# Patient Record
Sex: MALE | Race: WHITE | NOT HISPANIC OR LATINO | Employment: FULL TIME | URBAN - METROPOLITAN AREA
[De-identification: names, ages, dates, MRNs, and addresses within clinical notes are randomized per-mention and may not be internally consistent; named-entity substitution may affect disease eponyms.]

---

## 2023-10-13 PROBLEM — T14.8XXA AVULSION FRACTURE: Status: ACTIVE | Noted: 2023-10-13

## 2023-10-13 PROBLEM — N46.9 MALE INFERTILITY: Status: ACTIVE | Noted: 2023-10-13

## 2023-10-16 ENCOUNTER — OFFICE VISIT (OUTPATIENT)
Dept: ORTHOPEDIC SURGERY | Facility: CLINIC | Age: 33
End: 2023-10-16
Payer: COMMERCIAL

## 2023-10-16 ENCOUNTER — CLINICAL SUPPORT (OUTPATIENT)
Dept: ORTHOPEDIC SURGERY | Facility: CLINIC | Age: 33
End: 2023-10-16
Payer: COMMERCIAL

## 2023-10-16 DIAGNOSIS — S63.289D DISLOCATION OF FINGER PIP JOINT, SUBSEQUENT ENCOUNTER: ICD-10-CM

## 2023-10-16 DIAGNOSIS — S63.289D DISLOCATION OF FINGER PIP JOINT, SUBSEQUENT ENCOUNTER: Primary | ICD-10-CM

## 2023-10-16 PROCEDURE — 73140 X-RAY EXAM OF FINGER(S): CPT | Mod: LEFT SIDE | Performed by: FAMILY MEDICINE

## 2023-10-16 PROCEDURE — 99203 OFFICE O/P NEW LOW 30 MIN: CPT | Performed by: FAMILY MEDICINE

## 2023-10-16 NOTE — PROGRESS NOTES
Established Patient Follow-Up Visit    CC: No chief complaint on file.      HPI:  Merritt is a 33 y.o. male returns here today for follow-up visit regarding: Stiffness and swelling to the left fifth PIP joint.  He presents here today for follow-up, he has been active still working out and taking his finger for activities.  He is wean from the splint and is just taping intermittently for discomfort and support when doing activities.  He presents here today for further evaluation.  States still significant amount of swelling intermittent pain rates at about a 1 or 2.          REVIEW OF SYSTEMS:  GENERAL: Negative for malaise, significant weight loss, fever  MUSCULOSKELETAL: See HPI  NEURO: Negative for numbness / tingling       PHYSICAL EXAM:  -Neuro: Gross sensation intact to the upper extremities bilaterally.  -Extremity: Left small finger exam demonstrates soft tissue swelling about the PIP joint.  He has stiffness noted and lacks terminal extension by probably 5 degrees.  He is able to flex by 90 degrees.  Full intact extensor mechanism noted.  Negative Winston test.  Good distal cap refill noted.  No obvious medial or lateral angulation or rotational deformity seen.  No swan neck deformity.  No redness or erythema.  Good distal cap refill throughout.    IMAGING: See dictated report from today  XR fingers left 2+ views  Interpreted By:  Budinsky, Cole,   STUDY:  XR FINGERS LEFT 2+ VIEWS;  ;  10/16/2023 11:15 am      INDICATION:  Signs/Symptoms:pain.      ACCESSION NUMBER(S):  [id number]      ORDERING CLINICIAN:  COLE BUDINSKY      FINDINGS:  Three views left small finger demonstrate soft tissue swelling about  the PIP joint with subtle volar plate avulsion fracture. No presence  for additional fracture or dislocation.          Signed by: Cole Budinsky 10/16/2023 11:40 AM  Dictation workstation:   [id number]      PROCEDURE: None  Procedures     ASSESSMENT:   Follow-up visit for:  Problem List Items Addressed This  Visit    None  Visit Diagnoses       Dislocation of finger PIP joint, subsequent encounter    -  Primary    Relevant Orders    XR fingers left 2+ views (Completed)    Referral to Occupational Therapy    Follow Up In Orthopaedic Surgery             PLAN: At this time we will offer the patient occupational therapy prescription to work on range of motion and strength recovery and swelling/edema management.  We will see him back in 2 weeks for close follow-up.  Should he have persistent stiffness or inability to terminally extend or flex we will have low threshold to have patient follow-up with one of our hand and wrist specialists.  We also discussed potentially performing a steroid injection down the road for any soft tissue scarring discomfort.  For now recommended ice or heat to assist with swelling and range of motion.  Recommended over-the-counter Tylenol and anti-inflammatories to assist with swelling and pain control as well.  Orders Placed This Encounter    XR fingers left 2+ views    Referral to Occupational Therapy    Follow Up In Orthopaedic Surgery           At the conclusion of the visit there were no further questions by the patient/family regarding their plan of care.  Patient was instructed to call or return with any issues, questions, or concerns regarding their injury and/or treatment plan described above.     10/16/23 at 11:46 AM - Cole C Budinsky, MD    Office: [phone]    This note was prepared using voice recognition software.  The details of this note are correct and have been reviewed, and corrected to the best of my ability.  Some grammatical errors may persist related to the Dragon software.

## 2023-11-06 ENCOUNTER — OFFICE VISIT (OUTPATIENT)
Dept: ORTHOPEDIC SURGERY | Facility: CLINIC | Age: 33
End: 2023-11-06
Payer: COMMERCIAL

## 2023-11-06 DIAGNOSIS — S63.289D DISLOCATION OF FINGER PIP JOINT, SUBSEQUENT ENCOUNTER: ICD-10-CM

## 2023-11-06 PROCEDURE — 99213 OFFICE O/P EST LOW 20 MIN: CPT | Performed by: FAMILY MEDICINE

## 2023-11-06 PROCEDURE — 1036F TOBACCO NON-USER: CPT | Performed by: FAMILY MEDICINE

## 2023-11-06 ASSESSMENT — PAIN - FUNCTIONAL ASSESSMENT: PAIN_FUNCTIONAL_ASSESSMENT: 0-10

## 2023-11-06 ASSESSMENT — PAIN SCALES - GENERAL: PAINLEVEL_OUTOF10: 0 - NO PAIN

## 2023-11-06 NOTE — PROGRESS NOTES
Established Patient Follow-Up Visit    CC:   Chief Complaint   Patient presents with    Left Little Finger - Follow-up     Dislocation of finger PIP joint  Re evaluate today       HPI:  Merritt is a 33 y.o. male returns here today for follow-up visit regarding: Left small finger PIP dislocation.  He states no improvement in symptoms.  Still has a significant amount of stiffness and pain, even has trouble wearing his ring finger on the left hand due to to his small finger.  He stated he was working on lots of range of motion and promotion of range of motion recovery at home.  He knows some physical therapist and occupational therapist were able to provide him counseling.  He did not actually go to occupational therapy.          REVIEW OF SYSTEMS:  GENERAL: Negative for malaise, significant weight loss, fever  MUSCULOSKELETAL: See HPI  NEURO: Negative for numbness / tingling       PHYSICAL EXAM:  -Neuro: Gross sensation intact to the upper extremities bilaterally.  -Extremity: Left small finger exam: On inspection obvious soft tissue swelling noted about the PIP joint.  He has obvious stiffness is unable to fully extend out to 0 degrees, lacks maybe 25 to 30 degrees of terminal extension at the PIP.  The DIP joint has ability to extend and flex quite well.  The MCP is otherwise unaffected.  Tenderness to palpation to the ulnar and radial side of the PIP.  No redness or erythema no streaking cellulitis good distal cap refill noted.  Able to flex just less than 90 degrees at the PIP.    IMAGING: No new images today.  XR fingers left 2+ views  Interpreted By:  Budinsky, Cole,   STUDY:  XR FINGERS LEFT 2+ VIEWS;  ;  10/16/2023 11:15 am      INDICATION:  Signs/Symptoms:pain.      ACCESSION NUMBER(S):  [id number]      ORDERING CLINICIAN:  COLE BUDINSKY      FINDINGS:  Three views left small finger demonstrate soft tissue swelling about  the PIP joint with subtle volar plate avulsion fracture. No presence  for additional  fracture or dislocation.          Signed by: Cole Budinsky 10/16/2023 11:40 AM  Dictation workstation:   [id number]      PROCEDURE: None  Procedures     ASSESSMENT:   Follow-up visit for:  Problem List Items Addressed This Visit    None  Visit Diagnoses       Dislocation of finger PIP joint, subsequent encounter                 PLAN: At this time discussed my concern for stiffening of the joint and potential entrapment of the soft tissues or potential partial central slip.  At this time we will have the patient follow-up with the first available visit with Dr. Saadia Guzman, discussed with the patient there may be surgical indication to prevent worsening stiffness and to improve range of motion recovery.  Patient was agreeable to knowledge.  We will hold off on repeat x-rays or further imaging today.  No orders of the defined types were placed in this encounter.          At the conclusion of the visit there were no further questions by the patient/family regarding their plan of care.  Patient was instructed to call or return with any issues, questions, or concerns regarding their injury and/or treatment plan described above.     11/06/23 at 12:35 PM - Cole C Budinsky, MD    Office: [phone]    This note was prepared using voice recognition software.  The details of this note are correct and have been reviewed, and corrected to the best of my ability.  Some grammatical errors may persist related to the Dragon software.

## 2023-11-10 ENCOUNTER — OFFICE VISIT (OUTPATIENT)
Dept: ORTHOPEDIC SURGERY | Facility: CLINIC | Age: 33
End: 2023-11-10
Payer: COMMERCIAL

## 2023-11-10 DIAGNOSIS — S63.289D DISLOCATION OF FINGER PIP JOINT, SUBSEQUENT ENCOUNTER: Primary | ICD-10-CM

## 2023-11-10 PROCEDURE — 1036F TOBACCO NON-USER: CPT | Performed by: STUDENT IN AN ORGANIZED HEALTH CARE EDUCATION/TRAINING PROGRAM

## 2023-11-10 PROCEDURE — 99214 OFFICE O/P EST MOD 30 MIN: CPT | Performed by: STUDENT IN AN ORGANIZED HEALTH CARE EDUCATION/TRAINING PROGRAM

## 2023-11-10 ASSESSMENT — PAIN SCALES - GENERAL: PAINLEVEL_OUTOF10: 3

## 2023-11-10 ASSESSMENT — PAIN - FUNCTIONAL ASSESSMENT: PAIN_FUNCTIONAL_ASSESSMENT: 0-10

## 2023-11-10 NOTE — PROGRESS NOTES
History of Present Illness:  Presents for evaluation of left small finger PIP stiffness.  The patient sustained an acute injury and notes pain and swelling.  Sustained a PIP dislocation with volar plate injury on September 20, 2023.  He relocated this himself and has subsequently been seen by Dr. Budinsky.  Presents as he is frustrated with continued swelling to the PIP along with stiffness.  He has been working on range of motion at home.      Review of Systems   GENERAL: Negative for malaise, significant weight loss, fever  MUSCULOSKELETAL: see HPI  NEURO:  Negative    The patient's past medical history, family history, social history, and review of systems were reviewed. History is otherwise negative except as stated in the HPI.    Physical Examination:  The patient appears to be their stated age, is in no apparent distress, and is oriented x3. The patients mood and affect are appropriate. The patients gait is normal. The examination of the limb in question was performed in comparison to the contralateral limb.  On musculoskeletal examination, the patient has full elbow range of motion.  He does have swelling to the small finger PIP and tenderness over the volar plate.  Passively I am able to fully range his MCP PIP and DIP with a 0 cm tip to palm.  He lacks 5 degrees of full extension there is no obvious tenderness to palpation about the scaphoid or the SL interval, or distal radius. Sensation and motor function are intact in the radial, ulnar, and median nerve distribution. The patient has intact flexor and extensor function, but has difficulty making a full fist secondary to pain. The hand itself is warm and well perfused. The contralateral hand and wrist are normal to inspection, range of motion, stability, and strength.      Imaging:  AP, lateral, and oblique radiographs of the left small finger from October 16 finger demonstrate volar plate avulsion fx    Assessment:  Patient with a left small finger  volar plate avulsion fx. he has associated stiffness and swelling of this joint.  Discussed that this is a nagging injury and his range of motion being where it is at is already very good.  I cannot make this better with surgery.  I am concerned that he is looking more for improvement in the size of his knuckle which also I cannot improve with surgery.  I have encouraged him to have patience as this can nag for up to 6 months swelling will improve but I do not think he will ever have the same knuckle as the other side.    Plan:  Recommend continue working on range of motion.  Recommend against surgery at this time as he has almost full flexion and lacks minimal extension.  All questions were addressed    Follow up: DAVID Sweet MD

## 2023-11-22 ENCOUNTER — OFFICE VISIT (OUTPATIENT)
Dept: PRIMARY CARE | Facility: CLINIC | Age: 33
End: 2023-11-22
Payer: COMMERCIAL

## 2023-11-22 VITALS
WEIGHT: 231 LBS | HEART RATE: 64 BPM | HEIGHT: 78 IN | DIASTOLIC BLOOD PRESSURE: 76 MMHG | BODY MASS INDEX: 26.73 KG/M2 | SYSTOLIC BLOOD PRESSURE: 120 MMHG | TEMPERATURE: 97.8 F

## 2023-11-22 DIAGNOSIS — Z00.00 ANNUAL PHYSICAL EXAM: Primary | ICD-10-CM

## 2023-11-22 PROCEDURE — 99395 PREV VISIT EST AGE 18-39: CPT | Performed by: INTERNAL MEDICINE

## 2023-11-22 PROCEDURE — 1036F TOBACCO NON-USER: CPT | Performed by: INTERNAL MEDICINE

## 2023-11-22 ASSESSMENT — ENCOUNTER SYMPTOMS
GASTROINTESTINAL NEGATIVE: 1
ENDOCRINE NEGATIVE: 1
CARDIOVASCULAR NEGATIVE: 1
ALLERGIC/IMMUNOLOGIC NEGATIVE: 1
RESPIRATORY NEGATIVE: 1
PSYCHIATRIC NEGATIVE: 1
NEUROLOGICAL NEGATIVE: 1
HEMATOLOGIC/LYMPHATIC NEGATIVE: 1

## 2023-11-22 NOTE — PROGRESS NOTES
"Subjective   Patient ID: Merritt Alfredo is a 33 y.o. male who presents for Establish Care (Pt here to establish and physical).    HPI   Patient is here for wellness exam he had dislocated his PIP joint in the little finger has seen orthopedics and we recommended him to see another hand surgeon for second opinion as patient is really concerned about swelling and pain on range of motion  Review of Systems   HENT: Negative.     Respiratory: Negative.     Cardiovascular: Negative.    Gastrointestinal: Negative.    Endocrine: Negative.    Genitourinary: Negative.    Musculoskeletal:         See HPI   Allergic/Immunologic: Negative.    Neurological: Negative.    Hematological: Negative.    Psychiatric/Behavioral: Negative.     All other systems reviewed and are negative.      Objective   /76   Pulse 64   Temp 36.6 °C (97.8 °F) (Temporal)   Ht 1.981 m (6' 6\")   Wt 105 kg (231 lb)   BMI 26.69 kg/m²     Physical Exam  Vitals reviewed.   Constitutional:       Appearance: Normal appearance.   HENT:      Head: Normocephalic and atraumatic.   Eyes:      Pupils: Pupils are equal, round, and reactive to light.   Cardiovascular:      Rate and Rhythm: Normal rate and regular rhythm.   Pulmonary:      Effort: Pulmonary effort is normal.      Breath sounds: Normal breath sounds.   Abdominal:      Palpations: Abdomen is soft. There is no mass.   Musculoskeletal:         General: No swelling.      Comments: Swelling of the fifth PIP joint with painful range of motion and limitation of full flexion   Lymphadenopathy:      Cervical: No cervical adenopathy.   Skin:     Findings: No lesion or rash.   Neurological:      General: No focal deficit present.      Mental Status: He is alert and oriented to person, place, and time.   Psychiatric:         Mood and Affect: Mood normal.         Behavior: Behavior normal.         Thought Content: Thought content normal.       Assessment/Plan   Problem List Items Addressed This Visit            "  ICD-10-CM    Annual physical exam - Primary Z00.00     Patient blood work from December reviewed no additional blood work is required his blood work was within normal limits.  Rest of the wellness exam is within normal limits